# Patient Record
Sex: FEMALE | Race: BLACK OR AFRICAN AMERICAN | Employment: FULL TIME | URBAN - METROPOLITAN AREA
[De-identification: names, ages, dates, MRNs, and addresses within clinical notes are randomized per-mention and may not be internally consistent; named-entity substitution may affect disease eponyms.]

---

## 2017-07-07 ENCOUNTER — HOSPITAL ENCOUNTER (EMERGENCY)
Age: 52
Discharge: HOME | End: 2017-07-07
Attending: EMERGENCY MEDICINE
Payer: SELF-PAY

## 2017-07-07 ENCOUNTER — APPOINTMENT (OUTPATIENT)
Dept: GENERAL RADIOLOGY | Age: 52
End: 2017-07-07
Attending: NURSE PRACTITIONER
Payer: SELF-PAY

## 2017-07-07 VITALS
RESPIRATION RATE: 16 BRPM | HEART RATE: 83 BPM | SYSTOLIC BLOOD PRESSURE: 173 MMHG | BODY MASS INDEX: 32.47 KG/M2 | TEMPERATURE: 98.7 F | OXYGEN SATURATION: 99 % | HEIGHT: 61 IN | DIASTOLIC BLOOD PRESSURE: 106 MMHG | WEIGHT: 172 LBS

## 2017-07-07 DIAGNOSIS — M79.644 FINGER PAIN, RIGHT: Primary | ICD-10-CM

## 2017-07-07 PROCEDURE — 73130 X-RAY EXAM OF HAND: CPT

## 2017-07-07 PROCEDURE — 99282 EMERGENCY DEPT VISIT SF MDM: CPT | Performed by: NURSE PRACTITIONER

## 2017-07-07 PROCEDURE — 77030008303 HC SPLNT FNGR ALUM CONC -A

## 2017-07-07 PROCEDURE — 75810000053 HC SPLINT APPLICATION: Performed by: NURSE PRACTITIONER

## 2017-07-07 NOTE — ED PROVIDER NOTES
HPI Comments: 49 y/o f with pain right fourth finger after striking a table yesterday by accident. Table had metal shavings on it. Worried fb may be present as swelling is a little worse today. No fever or chills, no wounds. Neuro intact    Patient is a 50 y.o. female presenting with finger pain. The history is provided by the patient. No  was used. Finger Pain    This is a new problem. The current episode started yesterday. The problem occurs constantly. The problem has been gradually worsening. The pain is present in the right fingers. The quality of the pain is described as aching. The pain is mild. Associated symptoms include stiffness. Pertinent negatives include no numbness, full range of motion, no tingling, no itching, no back pain and no neck pain. There has been a history of trauma. Past Medical History:   Diagnosis Date    Hypertension        Past Surgical History:   Procedure Laterality Date    HX GYN      csection X4    HX ORTHOPAEDIC      knee         History reviewed. No pertinent family history. Social History     Social History    Marital status:      Spouse name: N/A    Number of children: N/A    Years of education: N/A     Occupational History    Not on file. Social History Main Topics    Smoking status: Never Smoker    Smokeless tobacco: Not on file    Alcohol use No    Drug use: No    Sexual activity: Not on file     Other Topics Concern    Not on file     Social History Narrative         ALLERGIES: Review of patient's allergies indicates no known allergies. Review of Systems   Constitutional: Negative for chills and fever. HENT: Negative for facial swelling and mouth sores. Eyes: Negative for discharge and redness. Respiratory: Negative for cough and shortness of breath. Cardiovascular: Negative for chest pain and palpitations. Gastrointestinal: Negative for nausea and vomiting.    Endocrine: Negative for cold intolerance and heat intolerance. Genitourinary: Negative for dysuria and flank pain. Musculoskeletal: Positive for myalgias and stiffness. Negative for back pain and neck pain. Skin: Negative for itching, pallor, rash and wound. Neurological: Negative for tingling, speech difficulty and numbness. Psychiatric/Behavioral: Negative for confusion and decreased concentration. Vitals:    07/07/17 1517   BP: (!) 173/106   Pulse: 83   Resp: 16   Temp: 98.7 °F (37.1 °C)   SpO2: 99%   Weight: 78 kg (172 lb)   Height: 5' 1\" (1.549 m)            Physical Exam   Constitutional: She is oriented to person, place, and time. She appears well-developed and well-nourished. No distress. HENT:   Head: Normocephalic and atraumatic. Right Ear: External ear normal.   Left Ear: External ear normal.   Nose: Nose normal.   Eyes: Conjunctivae and EOM are normal. Pupils are equal, round, and reactive to light. Neck: Normal range of motion. Neck supple. Cardiovascular: Normal rate, regular rhythm and normal heart sounds. Pulmonary/Chest: Effort normal and breath sounds normal. No respiratory distress. She has no wheezes. Abdominal: Soft. Bowel sounds are normal. She exhibits no distension. There is no tenderness. Musculoskeletal: Normal range of motion. She exhibits edema and tenderness. Hands:  Neurological: She is alert and oriented to person, place, and time. No cranial nerve deficit. Coordination normal.   Skin: Skin is warm and dry. No rash noted. Psychiatric: She has a normal mood and affect. Her behavior is normal. Judgment and thought content normal.   Nursing note and vitals reviewed. MDM  Number of Diagnoses or Management Options  Diagnosis management comments: 51 y/o f to ed with finger pain after accidentally striking table yesterday. Will xray  5:09 PM  No fracture or foreign body noted on xray.   Will place splint for protection for a few days, follow with family md       Amount and/or Complexity of Data Reviewed  Tests in the radiology section of CPT®: ordered and reviewed    Risk of Complications, Morbidity, and/or Mortality  Presenting problems: minimal  Diagnostic procedures: minimal  Management options: minimal    Patient Progress  Patient progress: stable    ED Course       Procedures

## 2017-07-07 NOTE — ED NOTES
I have reviewed discharge instructions with the patient. The patient verbalized understanding. Splint applied to finger before discharge. No prescriptions given.  Pt had no further questions

## 2017-07-07 NOTE — DISCHARGE INSTRUCTIONS
Learning About RICE (Rest, Ice, Compression, and Elevation)  What is RICE? RICE is a way to care for an injury. RICE helps relieve pain and swelling. It may also help with healing and flexibility. RICE stands for:  · Rest and protect the injured or sore area. · Ice or a cold pack used as soon as possible. · Compression, or wrapping the injured or sore area with an elastic bandage. · Elevation (propping up) the injured or sore area. How do you do RICE? You can use RICE for home treatment when you have general aches and pains or after an injury or surgery. Rest  · Do not put weight on the injury for at least 24 to 48 hours. · Use crutches for a badly sprained knee or ankle. · Support a sprained wrist, elbow, or shoulder with a sling. Ice  · Put ice or a cold pack on the injury right away to reduce pain and swelling. Frozen vegetables will also work as an ice pack. Put a thin cloth between the ice or cold pack and your skin. The cloth protects the injured area from getting too cold. · Use ice for 10 to 15 minutes at a time for the first 48 to 72 hours. Compression  · Use compression for sprains, strains, and surgeries of the arms and legs. · Wrap the injured area with an elastic bandage or compression sleeve to reduce swelling. · Don't wrap it too tightly. If the area below it feels numb, tingles, or feels cool, loosen the wrap. Elevation  · Use elevation for areas of the body that can be propped up, such as arms and legs. · Prop up the injured area on pillows whenever you use ice. Keep it propped up anytime you sit or lie down. · Try to keep the injured area at or above the level of your heart. This will help reduce swelling and bruising. Where can you learn more? Go to http://jonathon-aleksandar.info/. Enter M961 in the search box to learn more about \"Learning About RICE (Rest, Ice, Compression, and Elevation). \"  Current as of: March 21, 2017  Content Version: 11.3  © 0024-3887 [employer]. Care instructions adapted under license by [employer] (which disclaims liability or warranty for this information). If you have questions about a medical condition or this instruction, always ask your healthcare professional. [address] any warranty or liability for your use of this information.

## 2017-07-07 NOTE — ED TRIAGE NOTES
Patient hit right middle finger on table at work yesterday. Patient reports table had metal shavings on it. Last tetanus shot over 5 years ago. Swelling noted.

## 2018-04-08 ENCOUNTER — HOSPITAL ENCOUNTER (EMERGENCY)
Age: 53
Discharge: HOME | End: 2018-04-08
Attending: EMERGENCY MEDICINE
Payer: SELF-PAY

## 2018-04-08 VITALS
RESPIRATION RATE: 16 BRPM | DIASTOLIC BLOOD PRESSURE: 100 MMHG | OXYGEN SATURATION: 100 % | SYSTOLIC BLOOD PRESSURE: 167 MMHG | HEIGHT: 61 IN | HEART RATE: 67 BPM | BODY MASS INDEX: 33.04 KG/M2 | TEMPERATURE: 97.9 F | WEIGHT: 175 LBS

## 2018-04-08 DIAGNOSIS — L02.91 ABSCESS: Primary | ICD-10-CM

## 2018-04-08 PROCEDURE — 74011250637 HC RX REV CODE- 250/637: Performed by: NURSE PRACTITIONER

## 2018-04-08 PROCEDURE — 75810000289 HC I&D ABSCESS SIMP/COMP/MULT: Performed by: NURSE PRACTITIONER

## 2018-04-08 PROCEDURE — 99283 EMERGENCY DEPT VISIT LOW MDM: CPT | Performed by: NURSE PRACTITIONER

## 2018-04-08 RX ORDER — ACETAMINOPHEN 325 MG/1
650 TABLET ORAL
Status: COMPLETED | OUTPATIENT
Start: 2018-04-08 | End: 2018-04-08

## 2018-04-08 RX ORDER — CLINDAMYCIN HYDROCHLORIDE 150 MG/1
300 CAPSULE ORAL 4 TIMES DAILY
Qty: 56 CAP | Refills: 0 | Status: SHIPPED | OUTPATIENT
Start: 2018-04-08 | End: 2018-04-15

## 2018-04-08 RX ADMIN — ACETAMINOPHEN 650 MG: 325 TABLET ORAL at 10:24

## 2018-04-08 NOTE — LETTER
[facility] EMERGENCY DEPT One [address] [id number] [phone] Work/School Note Date: 4/8/2018 To Whom It May concern: 
 
Fermin Nielsen was seen and treated today in the emergency room by the following provider(s): 
Attending Provider: Sujatha Boucher MD 
Nurse Practitioner: Sebastián Rogers NP. Fermin Nielsen may return to work on Tuesday. Sincerely, Sebastián Rogers NP

## 2018-04-08 NOTE — ED PROVIDER NOTES
HPI Comments: 50year old female presents for evaluation of abscess to back of her neck. Onset was 2 days ago. She is not sure if she had an insect bite or ingrown hair. She has had pain and swelling  And difficulty moving her neck for the last 2 days it has progressed so that she has come here today. She has had no fever chills nausea vomiting diarrhea. No cough or congestion. She presents with a an approximately 2 cm area that is raised and very hard with palpation. There is approximately a 1/2-1 cm area of surrounding erythema. The central portion has no fluctuance. There is no drainage or discharge. She is on her menstrual cycle at this time. Admits history of hypertension    Patient is a 50 y.o. female presenting with abscess. The history is provided by the patient. No  was used. Abscess    This is a new problem. The current episode started 2 days ago. The problem has been gradually worsening. There has been no fever. The rash is present on the neck. The pain is moderate. The pain has been constant since onset. Associated symptoms include pain. Pertinent negatives include no blisters, no itching, no weeping and no hives. Past Medical History:   Diagnosis Date    Hypertension        Past Surgical History:   Procedure Laterality Date    HX GYN      csection X4    HX ORTHOPAEDIC      knee         No family history on file. Social History     Social History    Marital status:      Spouse name: N/A    Number of children: N/A    Years of education: N/A     Occupational History    Not on file. Social History Main Topics    Smoking status: Never Smoker    Smokeless tobacco: Not on file    Alcohol use No    Drug use: No    Sexual activity: Not on file     Other Topics Concern    Not on file     Social History Narrative         ALLERGIES: Review of patient's allergies indicates no known allergies.     Review of Systems   Constitutional: Negative for chills and fever. HENT: Negative for facial swelling and mouth sores. Eyes: Negative for discharge and redness. Respiratory: Negative for cough and shortness of breath. Cardiovascular: Negative for chest pain and palpitations. Gastrointestinal: Negative for diarrhea, nausea and vomiting. Endocrine: Negative for cold intolerance and heat intolerance. Genitourinary: Negative for difficulty urinating and dysuria. Musculoskeletal: Positive for neck pain and neck stiffness. Negative for back pain. Skin: Positive for wound. Negative for color change and itching. Neurological: Negative for dizziness and weakness. Psychiatric/Behavioral: Negative for confusion and decreased concentration. Vitals:    04/08/18 0928   BP: 197/73   Pulse: 77   Resp: 20   Temp: 97.6 °F (36.4 °C)   SpO2: 99%   Weight: 79.4 kg (175 lb)   Height: 5' 1\" (1.549 m)            Physical Exam   Constitutional: She is oriented to person, place, and time. She appears well-developed and well-nourished. No distress. HENT:   Head: Normocephalic and atraumatic. Right Ear: External ear normal.   Left Ear: External ear normal.   Nose: Nose normal.   Eyes: Conjunctivae and EOM are normal. Pupils are equal, round, and reactive to light. Neck: Normal range of motion. Neck supple. Cardiovascular: Normal rate, regular rhythm and normal heart sounds. Pulmonary/Chest: Effort normal and breath sounds normal. No respiratory distress. She has no wheezes. Abdominal: Soft. Bowel sounds are normal. She exhibits no distension. There is no tenderness. Musculoskeletal: Normal range of motion. She exhibits no edema or tenderness. Neurological: She is alert and oriented to person, place, and time. No cranial nerve deficit. Coordination normal.   Skin: Skin is warm and dry. No rash noted. Psychiatric: She has a normal mood and affect.  Her behavior is normal. Judgment and thought content normal.   Nursing note and vitals reviewed. MDM  Number of Diagnoses or Management Options  Abscess:   Diagnosis management comments: 50year old female presents for evaluation of abscess to back of her neck. Onset was 2 days ago. She is not sure if she had an insect bite or ingrown hair. She has had pain and swelling  And difficulty moving her neck for the last 2 days it has progressed so that she has come here today. She has had no fever chills nausea vomiting diarrhea. No cough or congestion. She presents with a an approximately 2 cm area that is raised and very hard with palpation. There is approximately a 1/2-1 cm area of surrounding erythema. The central portion has no fluctuance. There is no drainage or discharge. She is on her menstrual cycle at this time. Admits history of hypertension    I&D complete. Patient tolerated well. Will reevaluate her blood pressure prior to discharge. She refuses offer of pain medicine will discharge home with antibiotics. 10:33 AM\  Repeat of vital signs w bp 160/100. Pt still quite anxious over procedure. She has finally agreed to take some tylenol. She is having no symptoms other than anxiety and pain from her procedure. I think her pressure will continue to improve once she is home and away from this department.     Risk of Complications, Morbidity, and/or Mortality  Presenting problems: minimal  Diagnostic procedures: low  Management options: minimal    Patient Progress  Patient progress: stable        ED Course       I&D Abcess Simple  Date/Time: 4/8/2018 10:15 AM  Performed by: Nano Herndon  Authorized by: Juan TENA     Consent:     Consent obtained:  Verbal    Consent given by:  Patient    Risks discussed:  Bleeding, incomplete drainage, pain, damage to other organs and infection  Location:     Type:  Abscess    Size:  2    Location:  Neck    Neck location:  L posterior  Pre-procedure details:     Skin preparation:  Betadine  Anesthesia (see MAR for exact dosages): Anesthesia method:  Local infiltration    Local anesthetic:  Lidocaine 1% w/o epi  Procedure type:     Complexity:  Simple  Procedure details:     Incision types:  Single straight    Incision depth:  Subcutaneous    Scalpel blade:  11    Wound management:  Probed and deloculated, irrigated with saline and extensive cleaning    Drainage:  Bloody    Drainage amount: mild to moderate. Wound treatment:  Wound left open    Packing materials:  None  Post-procedure details:     Patient tolerance of procedure:   Tolerated well, no immediate complications

## 2018-04-08 NOTE — Clinical Note
Home with family. Keep wound clean and dry. Wash daily with soap and water or your Betadine diluted with water. Replace dressing daily and/or when soiled.

## 2018-04-08 NOTE — ED TRIAGE NOTES
Pt states she woke up Friday morning and had pain to the back of her neck on the left side. Pt has swelling to that area. Pt thinks she may have been bitten by something.

## 2018-04-08 NOTE — ED NOTES
I have reviewed discharge instructions with the patient. The patient verbalized understanding. Patient left ED via Discharge Method: ambulatory to Home with   Opportunity for questions and clarification provided. Patient given 1 scripts. To continue your aftercare when you leave the hospital, you may receive an automated call from our care team to check in on how you are doing. This is a free service and part of our promise to provide the best care and service to meet your aftercare needs.  If you have questions, or wish to unsubscribe from this service please call [phone]. Thank you for Choosing our [facility] Emergency Department.  \

## 2018-07-10 ENCOUNTER — HOSPITAL ENCOUNTER (EMERGENCY)
Age: 53
Discharge: HOME | End: 2018-07-10
Attending: EMERGENCY MEDICINE
Payer: SELF-PAY

## 2018-07-10 VITALS
DIASTOLIC BLOOD PRESSURE: 95 MMHG | TEMPERATURE: 98.1 F | HEART RATE: 70 BPM | WEIGHT: 170 LBS | BODY MASS INDEX: 32.1 KG/M2 | SYSTOLIC BLOOD PRESSURE: 165 MMHG | HEIGHT: 61 IN | OXYGEN SATURATION: 100 % | RESPIRATION RATE: 16 BRPM

## 2018-07-10 DIAGNOSIS — L73.9 FOLLICULITIS: Primary | ICD-10-CM

## 2018-07-10 PROCEDURE — 99283 EMERGENCY DEPT VISIT LOW MDM: CPT | Performed by: EMERGENCY MEDICINE

## 2018-07-10 RX ORDER — LISINOPRIL AND HYDROCHLOROTHIAZIDE 12.5; 2 MG/1; MG/1
1 TABLET ORAL DAILY
Qty: 30 TAB | Refills: 6 | Status: SHIPPED | OUTPATIENT
Start: 2018-07-10

## 2018-07-10 RX ORDER — SULFAMETHOXAZOLE AND TRIMETHOPRIM 800; 160 MG/1; MG/1
1 TABLET ORAL 2 TIMES DAILY
Qty: 14 TAB | Refills: 0 | Status: SHIPPED | OUTPATIENT
Start: 2018-07-10 | End: 2018-07-17

## 2018-07-10 RX ORDER — CHLORHEXIDINE GLUCONATE 4 G/100ML
1 SOLUTION TOPICAL 2 TIMES DAILY
Qty: 240 ML | Refills: 2 | Status: SHIPPED | OUTPATIENT
Start: 2018-07-10 | End: 2018-07-11

## 2018-07-10 NOTE — LETTER
[facility] [facility] EMERGENCY DEPT One [address] [phone] Work/School Note Date: 7/10/2018 To Whom It May concern: 
 
Wayne rFye was seen and treated today in the emergency room by the following provider(s): 
Attending Provider: Collins Pringle MD.   
 
Wayne Frye may return to work on 7/11/2018. Sincerely, Bre Madison RN

## 2018-07-10 NOTE — ED TRIAGE NOTES
Patient reports that she has some \"bumps on the back of the head\" that started last week. Patient reports that she can't even lay down because the area throbs.

## 2018-07-10 NOTE — ED NOTES
I have reviewed discharge instructions with the patient. The patient verbalized understanding. Patient left ED via Discharge Method: ambulatory to Home with self. Opportunity for questions and clarification provided. Patient given 3 scripts. To continue your aftercare when you leave the hospital, you may receive an automated call from our care team to check in on how you are doing. This is a free service and part of our promise to provide the best care and service to meet your aftercare needs.  If you have questions, or wish to unsubscribe from this service please call [phone]. Thank you for Choosing our [facility] Emergency Department.

## 2018-07-10 NOTE — ED PROVIDER NOTES
HPI Comments: 53-year-old female presents to emergency department with painful swelling in the back of her head. Patient states that she has multiple tender lumps on her scalp. Unable to sleep. No alleviating. Patient is a 48 y.o. female presenting with skin problem. Skin Problem Past Medical History:  
Diagnosis Date  Hypertension Past Surgical History:  
Procedure Laterality Date  HX GYN    
 csection X4  
 HX ORTHOPAEDIC    
 knee History reviewed. No pertinent family history. Social History Social History  Marital status:  Spouse name: N/A  
 Number of children: N/A  
 Years of education: N/A Occupational History  Not on file. Social History Main Topics  Smoking status: Never Smoker  Smokeless tobacco: Not on file  Alcohol use No  
 Drug use: No  
 Sexual activity: Not on file Other Topics Concern  Not on file Social History Narrative ALLERGIES: Review of patient's allergies indicates no known allergies. Review of Systems Constitutional: Negative for chills and fever. Skin: Positive for rash. Vitals:  
 07/10/18 1433 07/10/18 4905 BP: (!) 188/111 Pulse: 84 Resp: 16 Temp: 97.9 °F (36.6 °C) SpO2: 100% 100% Weight: 77.1 kg (170 lb) Height: 5' 1\" (1.549 m) Physical Exam  
Constitutional: She is oriented to person, place, and time. She appears well-developed and well-nourished. HENT:  
Head:  
 
 
Cardiovascular: Normal rate. Pulmonary/Chest: Breath sounds normal.  
Neurological: She is alert and oriented to person, place, and time. Nursing note and vitals reviewed. MDM Number of Diagnoses or Management Options Folliculitis:  
Diagnosis management comments: Probable follicuiltis start antibiotics. Hibiclens shampoo. Discharge home ED Course Procedures

## 2018-07-10 NOTE — DISCHARGE INSTRUCTIONS
Folliculitis: Care Instructions  Your Care Instructions    Folliculitis (say \"oax-DIZ-ebo-LY-tus\") is an infection of the pouches (follicles) in the skin where hair grows. It can occur on any part of the body, but it is most common on the scalp, face, armpits, and groin. Bacteria, such as those found in a hot tub, can cause folliculitis. Folliculitis begins as a red, tender area near a strand of hair. The skin can itch or burn and may drain pus or blood. Sometimes folliculitis can lead to more serious skin infections. Your doctor usually can treat mild folliculitis with an antibiotic cream or ointment. If you have folliculitis on your scalp, you may use a shampoo that kills bacteria. Antibiotics you take as pills can treat infections deeper in the skin. For stubborn cases of folliculitis, laser treatment may be an option. Laser treatment uses strong beams of light to destroy the hair follicle. But hair will no longer grow in the treated area. Follow-up care is a key part of your treatment and safety. Be sure to make and go to all appointments, and call your doctor if you are having problems. It's also a good idea to know your test results and keep a list of the medicines you take. How can you care for yourself at home? · Take your medicine exactly as prescribed. If your doctor prescribed antibiotics, take them as directed. Do not stop taking them just because you feel better. You need to take the full course of antibiotics. · Use a soap that kills bacteria to wash the infected area. If your scalp or beard is infected, use a shampoo with selenium or propylene glycol. Be careful. Do not scrub too long or too hard. · Mix 1 1/3 cup warm water and 1 tablespoon vinegar. Soak a cloth in the mixture, and place it over the infected skin until it cools off (usually 5 to 10 minutes). You can do this 3 to 6 times a day. · Do not share your razor, towel, or washcloth. That can spread folliculitis.   · Use a new blade in your razor each time you shave to keep from re-infecting your skin. · If you tend to get folliculitis, avoid using hot tubs. They can contain bacteria that cause folliculitis. When should you call for help? Call your doctor now or seek immediate medical care if:  ? · You have symptoms of infection, such as:  ¨ Increased pain, swelling, warmth, or redness. ¨ Red streaks leading from the area. ¨ Pus draining from the area. ¨ A fever. ? Watch closely for changes in your health, and be sure to contact your doctor if:  ? · You do not get better as expected. Where can you learn more? Go to http://jonathon-aleksandar.info/. Enter M257 in the search box to learn more about \"Folliculitis: Care Instructions. \"  Current as of: October 13, 2016  Content Version: 11.4  © 0435-6726 [employer]. Care instructions adapted under license by [employer] (which disclaims liability or warranty for this information). If you have questions about a medical condition or this instruction, always ask your healthcare professional. [address] any warranty or liability for your use of this information.

## 2018-12-03 ENCOUNTER — HOSPITAL ENCOUNTER (EMERGENCY)
Age: 53
Discharge: HOME | End: 2018-12-03
Attending: EMERGENCY MEDICINE
Payer: SELF-PAY

## 2018-12-03 VITALS
OXYGEN SATURATION: 100 % | DIASTOLIC BLOOD PRESSURE: 120 MMHG | WEIGHT: 171 LBS | SYSTOLIC BLOOD PRESSURE: 193 MMHG | BODY MASS INDEX: 32.28 KG/M2 | RESPIRATION RATE: 18 BRPM | HEIGHT: 61 IN | TEMPERATURE: 97.3 F

## 2018-12-03 DIAGNOSIS — M62.838 MUSCLE SPASM: ICD-10-CM
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
## 2018-12-03 PROCEDURE — 99282 EMERGENCY DEPT VISIT SF MDM: CPT | Performed by: NURSE PRACTITIONER

## 2018-12-03 RX ORDER — METHOCARBAMOL 750 MG/1
750 TABLET, FILM COATED ORAL 3 TIMES DAILY
Qty: 30 TAB | Refills: 0 | Status: SHIPPED | OUTPATIENT
Start: 2018-12-03

## 2018-12-03 NOTE — ED NOTES
I have reviewed discharge instructions with the patient. The patient verbalized understanding. Patient left ED via Discharge Method: ambulatory to Home with ). self Opportunity for questions and clarification provided. Patient given 1 scripts. To continue your aftercare when you leave the hospital, you may receive an automated call from our care team to check in on how you are doing. This is a free service and part of our promise to provide the best care and service to meet your aftercare needs.  If you have questions, or wish to unsubscribe from this service please call [phone]. Thank you for Choosing our [facility] Emergency Department.
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
## 2018-12-03 NOTE — DISCHARGE INSTRUCTIONS
Continue with motrin as you are already taking. Robaxin as prescribed  Stretches provided will help reduce pain. Follow up with your primary care provider for a recheck if symptoms fail to improve. Return to the Emergency Department as needed.

## 2019-05-10 ENCOUNTER — HOSPITAL ENCOUNTER (EMERGENCY)
Age: 54
Discharge: HOME | End: 2019-05-10
Attending: EMERGENCY MEDICINE
Payer: SELF-PAY

## 2019-05-10 VITALS
OXYGEN SATURATION: 100 % | WEIGHT: 179 LBS | TEMPERATURE: 98.5 F | SYSTOLIC BLOOD PRESSURE: 198 MMHG | BODY MASS INDEX: 33.79 KG/M2 | HEIGHT: 61 IN | HEART RATE: 76 BPM | RESPIRATION RATE: 16 BRPM | DIASTOLIC BLOOD PRESSURE: 100 MMHG

## 2019-05-10 PROCEDURE — 75810000275 HC EMERGENCY DEPT VISIT NO LEVEL OF CARE: Performed by: EMERGENCY MEDICINE

## 2019-05-10 NOTE — ED PROVIDER NOTES
Patient cannot be found in the room. Checked twice. Patient has left after triage but was not personally seen by me.

## 2019-05-10 NOTE — ED TRIAGE NOTES
Patient reports possible insect bite to right shin from 2 days ago. HTN in triage, states did not take medication today as prescribed

## 2021-02-23 ENCOUNTER — APPOINTMENT (OUTPATIENT)
Dept: GENERAL RADIOLOGY | Age: 56
End: 2021-02-23
Attending: PHYSICIAN ASSISTANT

## 2021-02-23 ENCOUNTER — HOSPITAL ENCOUNTER (EMERGENCY)
Age: 56
Discharge: HOME | End: 2021-02-23
Attending: STUDENT IN AN ORGANIZED HEALTH CARE EDUCATION/TRAINING PROGRAM

## 2021-02-23 VITALS
HEIGHT: 61 IN | WEIGHT: 175 LBS | OXYGEN SATURATION: 96 % | DIASTOLIC BLOOD PRESSURE: 103 MMHG | SYSTOLIC BLOOD PRESSURE: 196 MMHG | TEMPERATURE: 98.4 F | RESPIRATION RATE: 16 BRPM | BODY MASS INDEX: 33.04 KG/M2 | HEART RATE: 73 BPM
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
## 2021-02-23 DIAGNOSIS — S16.1XXA STRAIN OF NECK MUSCLE, INITIAL ENCOUNTER: ICD-10-CM

## 2021-02-23 PROCEDURE — 99283 EMERGENCY DEPT VISIT LOW MDM: CPT

## 2021-02-23 PROCEDURE — 72040 X-RAY EXAM NECK SPINE 2-3 VW: CPT

## 2021-02-23 RX ORDER — LIDOCAINE 50 MG/G
PATCH TOPICAL
Qty: 30 EACH | Refills: 0 | Status: SHIPPED | OUTPATIENT
Start: 2021-02-23

## 2021-02-23 RX ORDER — CYCLOBENZAPRINE HCL 5 MG
5 TABLET ORAL
Qty: 20 TAB | Refills: 0 | Status: SHIPPED | OUTPATIENT
Start: 2021-02-23

## 2021-02-23 NOTE — ED NOTES
I have reviewed discharge instructions with the patient and spouse. The patient and spouse verbalized understanding. Patient left ED via Discharge Method: ambulatory to Home with her , Luz Elena Camp. Opportunity for questions and clarification provided. Patient given 2 scripts. To continue your aftercare when you leave the hospital, you may receive an automated call from our care team to check in on how you are doing. This is a free service and part of our promise to provide the best care and service to meet your aftercare needs.  If you have questions, or wish to unsubscribe from this service please call [phone]. Thank you for Choosing our [address] [place] [address] Emergency Department.

## 2021-02-23 NOTE — Clinical Note
[address] [address] [place] EMERGENC[facility] DE[facility] 
[address] 
[place] [id number] [phone] Work/School Note Date: 2/23/2021 To Whom It May concern: 
 
Angie aBird was seen and treated today in the emergency room by the following provider(s): 
Attending Provider: Salvatore Willams DO Physician Assistant: ERICA Gonzales. Angie Baird is excused from work/school on 2/23/2021 through 2/26/2021. She is medically clear to return to work/school on 2/27/2021. Sincerely, ERICA Matthews
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
## 2021-02-23 NOTE — DISCHARGE INSTRUCTIONS
Use warm, moist heat to area, massage, gentle range of motion and stretching to area. Use the medication as directed, ED if worse. I will refer to a chiropractor for follow up if needed. Also, follow up with PCP for recheck.

## 2023-02-11 ENCOUNTER — HOSPITAL ENCOUNTER (EMERGENCY)
Age: 58
Discharge: HOME | End: 2023-02-11
Attending: EMERGENCY MEDICINE
Payer: COMMERCIAL

## 2023-02-11 VITALS
SYSTOLIC BLOOD PRESSURE: 212 MMHG | RESPIRATION RATE: 20 BRPM | DIASTOLIC BLOOD PRESSURE: 143 MMHG | BODY MASS INDEX: 34.55 KG/M2 | HEIGHT: 61 IN | OXYGEN SATURATION: 97 % | HEART RATE: 104 BPM | TEMPERATURE: 99.7 F | WEIGHT: 183 LBS

## 2023-02-11 DIAGNOSIS — K04.7 DENTAL ABSCESS: Primary | ICD-10-CM

## 2023-02-11 DIAGNOSIS — L03.211 FACIAL CELLULITIS: ICD-10-CM

## 2023-02-11 PROCEDURE — 6360000002 HC RX W HCPCS: Performed by: EMERGENCY MEDICINE

## 2023-02-11 PROCEDURE — 96372 THER/PROPH/DIAG INJ SC/IM: CPT

## 2023-02-11 PROCEDURE — 6370000000 HC RX 637 (ALT 250 FOR IP): Performed by: EMERGENCY MEDICINE

## 2023-02-11 PROCEDURE — 99284 EMERGENCY DEPT VISIT MOD MDM: CPT

## 2023-02-11 RX ORDER — CEFTRIAXONE 1 G/1
1000 INJECTION, POWDER, FOR SOLUTION INTRAMUSCULAR; INTRAVENOUS
Status: COMPLETED | OUTPATIENT
Start: 2023-02-11 | End: 2023-02-11

## 2023-02-11 RX ORDER — AMOXICILLIN AND CLAVULANATE POTASSIUM 875; 125 MG/1; MG/1
1 TABLET, FILM COATED ORAL 2 TIMES DAILY
Qty: 14 TABLET | Refills: 0 | Status: SHIPPED | OUTPATIENT
Start: 2023-02-11 | End: 2023-02-21

## 2023-02-11 RX ORDER — LISINOPRIL AND HYDROCHLOROTHIAZIDE 20; 12.5 MG/1; MG/1
1 TABLET ORAL DAILY
Qty: 30 TABLET | Refills: 0 | Status: SHIPPED | OUTPATIENT
Start: 2023-02-11 | End: 2023-03-13

## 2023-02-11 RX ORDER — HYDROCHLOROTHIAZIDE 25 MG/1
12.5 TABLET ORAL
Status: COMPLETED | OUTPATIENT
Start: 2023-02-11 | End: 2023-02-11

## 2023-02-11 RX ORDER — LISINOPRIL 20 MG/1
20 TABLET ORAL
Status: COMPLETED | OUTPATIENT
Start: 2023-02-11 | End: 2023-02-11

## 2023-02-11 RX ORDER — IBUPROFEN 600 MG/1
600 TABLET ORAL EVERY 6 HOURS PRN
Qty: 30 TABLET | Refills: 0 | Status: SHIPPED | OUTPATIENT
Start: 2023-02-11 | End: 2023-02-21

## 2023-02-11 RX ADMIN — CEFTRIAXONE 1000 MG: 1 INJECTION, POWDER, FOR SOLUTION INTRAMUSCULAR; INTRAVENOUS at 09:42

## 2023-02-11 RX ADMIN — HYDROCHLOROTHIAZIDE 12.5 MG: 25 TABLET ORAL at 09:41

## 2023-02-11 RX ADMIN — LISINOPRIL 20 MG: 20 TABLET ORAL at 09:42

## 2023-02-11 ASSESSMENT — ENCOUNTER SYMPTOMS
VOICE CHANGE: 0
TROUBLE SWALLOWING: 0
SHORTNESS OF BREATH: 0
FACIAL SWELLING: 1
COUGH: 0

## 2023-02-11 ASSESSMENT — PAIN SCALES - GENERAL
PAINLEVEL_OUTOF10: 6
PAINLEVEL_OUTOF10: 8

## 2023-02-11 ASSESSMENT — PAIN - FUNCTIONAL ASSESSMENT
PAIN_FUNCTIONAL_ASSESSMENT: ACTIVITIES ARE NOT PREVENTED
PAIN_FUNCTIONAL_ASSESSMENT: 0-10

## 2023-02-11 ASSESSMENT — PAIN DESCRIPTION - LOCATION: LOCATION: MOUTH

## 2023-02-11 ASSESSMENT — LIFESTYLE VARIABLES: HOW MANY STANDARD DRINKS CONTAINING ALCOHOL DO YOU HAVE ON A TYPICAL DAY: PATIENT DOES NOT DRINK

## 2023-02-11 ASSESSMENT — PAIN DESCRIPTION - DESCRIPTORS: DESCRIPTORS: SHARP

## 2023-02-11 ASSESSMENT — PAIN DESCRIPTION - ORIENTATION: ORIENTATION: LEFT;LOWER

## 2023-02-11 NOTE — ED TRIAGE NOTES
Pt c/o left lower dental pain for 2 days. States that she had a filling to come out.   Also, out of her BP

## 2023-02-11 NOTE — ED NOTES
Pt presents to the ED for c/o left lower facial swelling and dental pain.   Pt states that she is having a difficult time getting her BP meds      Lola Carreon RN  02/11/23 3012

## 2023-02-11 NOTE — DISCHARGE INSTRUCTIONS
We would love to help you get a primary care doctor for follow-up after your emergency department visit. Please call [phone] between 7AM - 6PM Monday to Friday. A care navigator will be able to assist you with setting up a doctor close to your home. Dental Services     The Emergency Department is not able to provide dental services - tooth extractions, root canal. Though we can provide antibiotics for abccess or infection. Listed below are free or low-cost options. [facility], [address] [facility]   [phone]  Tuesday and Thursday- registration starts at 10am. After registering you are given a time to return  Provides free x-rays, extractions, treatment of infection, and dental hygeine. Cannot have medicare, medicaid or other medical insurance coverage  Bring proof of [place]** residency (power bill, for example), Social Security Number and household income documents (including food stamps) as well as any current medications. (**if you do not live in [place], contact the free clinic in your home county for the availability of dental services)    [address]  [address] [facility], [address]  [phone]  Monday and Wednesday 8a-5p Tuesday and Thursday    8a-7p Friday 12-5p  Provides Adult and Childrens services. X-rays, extractions, treatment of infection, restorative care  Accepts medicaid, private insurance, self-pay.  Fees are on a sliding scale based on income (need to bring documentation)    [facility] [address] [facility], [address] [facility]     [phone]  Monday - Friday 8am-5p  Accepts medicaid for dental (but not dentures under 21)  Provides xrays, extractions, partials and dentures    [facility] [place], [address] [facility], [address] [phone]  Monday- Friday 9a-5p  First Come- First Served  Accepts medicaid, or self pay at or near medicaid price.   Urgent Care only for xrays, extractions fillings and infections    [facility], [place] -- Call [facility] [phone],   --- request a visit from the [address] [place] [address], [place]  --- follow the prompts and leave a message  ---A nurse will call you back for a pre-screen and give you a time and location where you will be seen

## 2023-02-11 NOTE — ED PROVIDER NOTES
Emergency Department Provider Note                   PCP:                No primary care provider on file. Age: 56 y.o. Sex: female     No diagnosis found. DISPOSITION         MDM  Number of Diagnoses or Management Options  Dental abscess: new, needed workup  Facial cellulitis: new, needed workup  Diagnosis management comments: Patient presents with a dental abscess and facial cellulitis. There does not appear to be a drainable abscess at this point but I did alert the patient to the fact that this can develop. The patient was offered IM antibiotics as a starting dose and she agreed. She was also noted to be hypertensive and has been off her medication for some time. Her normal dose of medication is given here and I will prescribe the same. She is given the [employer] number to help follow-up and find a primary care physician as well as dental resources. Amount and/or Complexity of Data Reviewed  Decide to obtain previous medical records or to obtain history from someone other than the patient: no  Obtain history from someone other than the patient: no  Review and summarize past medical records: no  Discuss the patient with other providers: no  Independent visualization of images, tracings, or specimens: no    Risk of Complications, Morbidity, and/or Mortality  Presenting problems: moderate  Diagnostic procedures: minimal  Management options: moderate    Patient Progress  Patient progress: stable             No orders of the defined types were placed in this encounter. Medications - No data to display    New Prescriptions    No medications on file        Lisa Morales is a 56 y.o. female who presents to the Emergency Department with chief complaint of  No chief complaint on file. Patient presents with dental pain and swelling on the left lower jaw. She has had pain and issues on that side in the past but it has improved.  Overnight the patient notes left lower jaw and facial swelling. No drainage. Patient also notes she is supposed to be on medications for BP but has been off as she has been trying to find a new PCP. No fevers. The history is provided by the patient. No  was used. All other systems reviewed and are negative unless otherwise stated in the history of present illness section. Review of Systems   Constitutional:  Negative for chills and fever. HENT:  Positive for facial swelling. Negative for ear pain, trouble swallowing and voice change. Respiratory:  Negative for cough and shortness of breath. Cardiovascular:  Negative for chest pain. Allergic/Immunologic: Negative for immunocompromised state. Neurological:  Negative for headaches. Past Medical History:   Diagnosis Date    Hypertension         Past Surgical History:   Procedure Laterality Date    GYN      csection X4    ORTHOPEDIC SURGERY      knee        No family history on file. Social History     Socioeconomic History    Marital status:    Tobacco Use    Smoking status: Never    Smokeless tobacco: Never   Substance and Sexual Activity    Alcohol use: No    Drug use: No        Allergies: Patient has no allergy information on record. Previous Medications    CYCLOBENZAPRINE (FLEXERIL) 5 MG TABLET    Take 5 mg by mouth 3 times daily as needed    LIDOCAINE (LIDODERM) 5 %    Apply patch to the affected area for 12 hours a day and remove for 12 hours a day. LISINOPRIL-HYDROCHLOROTHIAZIDE (PRINZIDE;ZESTORETIC) 20-12.5 MG PER TABLET    Take 1 tablet by mouth daily    METHOCARBAMOL (ROBAXIN) 750 MG TABLET    Take 750 mg by mouth 3 times daily        Vitals signs and nursing note reviewed. No data found. Physical Exam  Constitutional:       Appearance: Normal appearance.    HENT:      Mouth/Throat:      Comments: Patient has poor dentition to the left lower posterior molars with a severely decayed molar that is loose and has some inflammation around the base. There is swelling of the left lower lateral soft tissues of the face and noted induration. No fluctuance or drainable abscess is noted. Cardiovascular:      Rate and Rhythm: Normal rate and regular rhythm. Pulses: Normal pulses. Heart sounds: Normal heart sounds. Pulmonary:      Effort: Pulmonary effort is normal.      Breath sounds: Normal breath sounds. Musculoskeletal:      Cervical back: Normal range of motion and neck supple. Skin:     General: Skin is warm and dry. Neurological:      General: No focal deficit present. Mental Status: She is alert. Procedures        No orders to display                         Voice dictation software was used during the making of this note. This software is not perfect and grammatical and other typographical errors may be present. This note has not been completely proofread for errors.      Shanel Davis MD  02/11/23 8271

## 2023-02-11 NOTE — ED NOTES
I have reviewed discharge instructions with the patient. The patient verbalized understanding. Patient left ED via Discharge Method: ambulatory to Home with ( family self,). Opportunity for questions and clarification provided. Patient given 3 scripts. To continue your aftercare when you leave the hospital, you may receive an automated call from our care team to check in on how you are doing. This is a free service and part of our promise to provide the best care and service to meet your aftercare needs.  If you have questions, or wish to unsubscribe from this service please call [phone]. Thank you for Choosing our [facility] Emergency Department.        Roxanne Rosario RN  02/11/23 4872

## 2023-02-17 NOTE — ED NOTES
2/17/23 1035: Patient called ED inquiring about the missing 6 pills of augmentin. States she was told BID for 10 days but only 14 pills dispensed. This RN spoke with Dr Wally Mendez and will call in additional 6 pills to [address].       Hung Flores, STEPHANE  02/17/23 1038

## 2023-06-05 ENCOUNTER — HOSPITAL ENCOUNTER (EMERGENCY)
Age: 58
Discharge: HOME | End: 2023-06-05
Attending: EMERGENCY MEDICINE
Payer: COMMERCIAL

## 2023-06-05 ENCOUNTER — APPOINTMENT (OUTPATIENT)
Dept: GENERAL RADIOLOGY | Age: 58
End: 2023-06-05
Payer: COMMERCIAL

## 2023-06-05 VITALS
RESPIRATION RATE: 20 BRPM | HEART RATE: 89 BPM | WEIGHT: 177 LBS | DIASTOLIC BLOOD PRESSURE: 115 MMHG | TEMPERATURE: 99 F | OXYGEN SATURATION: 96 % | HEIGHT: 61 IN | SYSTOLIC BLOOD PRESSURE: 215 MMHG | BODY MASS INDEX: 33.42 KG/M2

## 2023-06-05 DIAGNOSIS — I10 ESSENTIAL HYPERTENSION: ICD-10-CM

## 2023-06-05 DIAGNOSIS — K08.89 PAIN, DENTAL: Primary | ICD-10-CM

## 2023-06-05 PROCEDURE — 99283 EMERGENCY DEPT VISIT LOW MDM: CPT

## 2023-06-05 RX ORDER — LISINOPRIL AND HYDROCHLOROTHIAZIDE 20; 12.5 MG/1; MG/1
1 TABLET ORAL DAILY
Qty: 30 TABLET | Refills: 2 | Status: SHIPPED | OUTPATIENT
Start: 2023-06-05 | End: 2023-09-03

## 2023-06-05 RX ORDER — PENICILLIN V POTASSIUM 500 MG/1
500 TABLET ORAL 4 TIMES DAILY
Qty: 40 TABLET | Refills: 0 | Status: SHIPPED | OUTPATIENT
Start: 2023-06-05 | End: 2023-06-15

## 2023-06-05 RX ORDER — LABETALOL HYDROCHLORIDE 5 MG/ML
20 INJECTION, SOLUTION INTRAVENOUS ONCE
Status: DISCONTINUED | OUTPATIENT
Start: 2023-06-05 | End: 2023-06-05

## 2023-06-05 ASSESSMENT — LIFESTYLE VARIABLES
HOW OFTEN DO YOU HAVE A DRINK CONTAINING ALCOHOL: NEVER
HOW MANY STANDARD DRINKS CONTAINING ALCOHOL DO YOU HAVE ON A TYPICAL DAY: PATIENT DOES NOT DRINK
HOW MANY STANDARD DRINKS CONTAINING ALCOHOL DO YOU HAVE ON A TYPICAL DAY: PATIENT DOES NOT DRINK

## 2023-06-05 ASSESSMENT — ENCOUNTER SYMPTOMS
DIARRHEA: 0
COLOR CHANGE: 0
VOMITING: 0
ABDOMINAL PAIN: 0
CHEST TIGHTNESS: 0
SHORTNESS OF BREATH: 0
NAUSEA: 0

## 2023-06-05 ASSESSMENT — PAIN SCALES - GENERAL: PAINLEVEL_OUTOF10: 8

## 2023-06-05 ASSESSMENT — PAIN - FUNCTIONAL ASSESSMENT: PAIN_FUNCTIONAL_ASSESSMENT: 0-10

## 2023-06-05 NOTE — ED PROVIDER NOTES
Emergency Department Provider Note       PCP: ANKITA Chaidez NP   Age: 66 y.o. Sex: female     DISPOSITION Decision To Discharge 06/05/2023 05:46:42 PM       ICD-10-CM    1. Pain, dental  K08.89       2. Essential hypertension  I10           Medical Decision Making     Complexity of Problems Addressed:  1 stable acute illness    Data Reviewed and Analyzed:  Category 1:   I independently ordered and reviewed each unique test.         Category 2:       Category 3: Discussion of management or test interpretation. 57-year-old female presenting with left lower dental pain and facial swelling that started a few days ago. Also has some hypertensive concerns given she has been out of her antihypertensive for a week. Patient very hypertensive upon arrival at 226/132. She is generally well-appearing in no acute distress. She has mild soft tissue edema of the left lower mandible. She has generally poor dentition throughout with multiple cracked teeth most notably in the left lower area. No obviously visible abscess. I suspect there is some contribution of pain towards her hypertension, however, we will obtain basic labs, EKG, chest x-ray and treat with antihypertensives. Further chart review revealed that her pressures have been largely uncontrolled for some time. She unfortunately appears to not have established with long-term primary care to have regular antihypertensive prescriptions. I was notified by nursing staff the patient is unfortunately declining labs, EKG. I discussed with the patient. She would prefer not to have any invasive testing performed regard to her hypertension. Patient is of capable decision-making capacity. I discussed the risks and benefits of this. I decided against AMA given she is not really having any hypertensive symptoms at this time.   We will plan to refill her lisinopril-HCTZ and start penicillin in the interim until she can get into see a dentist.  Patient stable

## 2023-06-05 NOTE — ED TRIAGE NOTES
Pt c/o pain and swelling to the left lower side of her face for several days. States that she has \"bad teeth\".   Pt is also out her BP meds for approx 1 week

## 2023-06-05 NOTE — DISCHARGE INSTRUCTIONS
Medication sent to pharmacy. Take as prescribed. The antibiotic will improve pain but would not solve the problem. You need to follow-up with dentistry to have definitive treatment. Keep a close monitoring of blood pressure and get into see your primary doctor as soon as possible to have this rechecked. Please return any worsening symptoms or concerns in the interim.

## 2023-06-05 NOTE — ED NOTES
Pt d/c home, Md aware of blood pressure, Rx and d/c papers given, all questions answered, pt left on foot.       Thai Green RN  06/05/23 4445

## 2025-07-09 NOTE — DISCHARGE INSTRUCTIONS
